# Patient Record
Sex: FEMALE | Race: WHITE | NOT HISPANIC OR LATINO | Employment: FULL TIME | URBAN - METROPOLITAN AREA
[De-identification: names, ages, dates, MRNs, and addresses within clinical notes are randomized per-mention and may not be internally consistent; named-entity substitution may affect disease eponyms.]

---

## 2022-03-03 PROBLEM — Z90.49 S/P COLON RESECTION: Status: ACTIVE | Noted: 2021-11-16

## 2022-03-03 PROBLEM — K52.9 COLITIS: Status: ACTIVE | Noted: 2021-11-03

## 2022-03-03 PROBLEM — E88.819 INSULIN RESISTANCE: Status: ACTIVE | Noted: 2018-08-15

## 2022-03-03 PROBLEM — N80.50: Status: ACTIVE | Noted: 2021-12-08

## 2023-09-27 ENCOUNTER — TELEPHONE (OUTPATIENT)
Dept: UROLOGY | Facility: CLINIC | Age: 33
End: 2023-09-27
Payer: COMMERCIAL

## 2023-10-04 ENCOUNTER — OFFICE VISIT (OUTPATIENT)
Dept: UROLOGY | Facility: CLINIC | Age: 33
End: 2023-10-04
Payer: COMMERCIAL

## 2023-10-04 VITALS
RESPIRATION RATE: 18 BRPM | SYSTOLIC BLOOD PRESSURE: 128 MMHG | WEIGHT: 141.75 LBS | TEMPERATURE: 98 F | BODY MASS INDEX: 24.2 KG/M2 | HEIGHT: 64 IN | DIASTOLIC BLOOD PRESSURE: 85 MMHG | HEART RATE: 88 BPM

## 2023-10-04 DIAGNOSIS — R10.9 ABDOMINAL PAIN, UNSPECIFIED ABDOMINAL LOCATION: ICD-10-CM

## 2023-10-04 DIAGNOSIS — R31.29 MICROHEMATURIA: ICD-10-CM

## 2023-10-04 DIAGNOSIS — R10.9 FLANK PAIN: ICD-10-CM

## 2023-10-04 DIAGNOSIS — N23 KIDNEY PAIN: Primary | ICD-10-CM

## 2023-10-04 LAB
BILIRUB UR QL STRIP: NEGATIVE
GLUCOSE UR QL STRIP: NEGATIVE
KETONES UR QL STRIP: NEGATIVE
LEUKOCYTE ESTERASE UR QL STRIP: NEGATIVE
PH, POC UA: 5.5
POC BLOOD, URINE: POSITIVE
POC NITRATES, URINE: NEGATIVE
POC RESIDUAL URINE VOLUME: 0 ML (ref 0–100)
PROT UR QL STRIP: NEGATIVE
SP GR UR STRIP: 1.03 (ref 1–1.03)
UROBILINOGEN UR STRIP-ACNC: 0.2 (ref 0.1–1.1)

## 2023-10-04 PROCEDURE — 87186 SC STD MICRODIL/AGAR DIL: CPT | Performed by: UROLOGY

## 2023-10-04 PROCEDURE — 99204 PR OFFICE/OUTPT VISIT, NEW, LEVL IV, 45-59 MIN: ICD-10-PCS | Mod: S$GLB,,, | Performed by: UROLOGY

## 2023-10-04 PROCEDURE — 1159F PR MEDICATION LIST DOCUMENTED IN MEDICAL RECORD: ICD-10-PCS | Mod: CPTII,S$GLB,, | Performed by: UROLOGY

## 2023-10-04 PROCEDURE — 3074F PR MOST RECENT SYSTOLIC BLOOD PRESSURE < 130 MM HG: ICD-10-PCS | Mod: CPTII,S$GLB,, | Performed by: UROLOGY

## 2023-10-04 PROCEDURE — 87088 URINE BACTERIA CULTURE: CPT | Performed by: UROLOGY

## 2023-10-04 PROCEDURE — 81001 URINALYSIS AUTO W/SCOPE: CPT | Performed by: UROLOGY

## 2023-10-04 PROCEDURE — 3079F DIAST BP 80-89 MM HG: CPT | Mod: CPTII,S$GLB,, | Performed by: UROLOGY

## 2023-10-04 PROCEDURE — 1159F MED LIST DOCD IN RCRD: CPT | Mod: CPTII,S$GLB,, | Performed by: UROLOGY

## 2023-10-04 PROCEDURE — 3008F PR BODY MASS INDEX (BMI) DOCUMENTED: ICD-10-PCS | Mod: CPTII,S$GLB,, | Performed by: UROLOGY

## 2023-10-04 PROCEDURE — 99204 OFFICE O/P NEW MOD 45 MIN: CPT | Mod: S$GLB,,, | Performed by: UROLOGY

## 2023-10-04 PROCEDURE — 81001 URINALYSIS AUTO W/SCOPE: CPT | Mod: S$GLB,,, | Performed by: UROLOGY

## 2023-10-04 PROCEDURE — 3079F PR MOST RECENT DIASTOLIC BLOOD PRESSURE 80-89 MM HG: ICD-10-PCS | Mod: CPTII,S$GLB,, | Performed by: UROLOGY

## 2023-10-04 PROCEDURE — 3008F BODY MASS INDEX DOCD: CPT | Mod: CPTII,S$GLB,, | Performed by: UROLOGY

## 2023-10-04 PROCEDURE — 51798 POCT BLADDER SCAN: ICD-10-PCS | Mod: S$GLB,,, | Performed by: UROLOGY

## 2023-10-04 PROCEDURE — 99999 PR PBB SHADOW E&M-EST. PATIENT-LVL IV: CPT | Mod: PBBFAC,,, | Performed by: UROLOGY

## 2023-10-04 PROCEDURE — 99999 PR PBB SHADOW E&M-EST. PATIENT-LVL IV: ICD-10-PCS | Mod: PBBFAC,,, | Performed by: UROLOGY

## 2023-10-04 PROCEDURE — 51798 US URINE CAPACITY MEASURE: CPT | Mod: S$GLB,,, | Performed by: UROLOGY

## 2023-10-04 PROCEDURE — 87086 URINE CULTURE/COLONY COUNT: CPT | Performed by: UROLOGY

## 2023-10-04 PROCEDURE — 87077 CULTURE AEROBIC IDENTIFY: CPT | Performed by: UROLOGY

## 2023-10-04 PROCEDURE — 3074F SYST BP LT 130 MM HG: CPT | Mod: CPTII,S$GLB,, | Performed by: UROLOGY

## 2023-10-04 PROCEDURE — 81001 POCT URINALYSIS, DIPSTICK, AUTOMATED, W/O SCOPE: ICD-10-PCS | Mod: S$GLB,,, | Performed by: UROLOGY

## 2023-10-04 NOTE — PROGRESS NOTES
Chief Complaint   Patient presents with    Other     Follow up. Pt reports of kidney pain on the right pain.         History of Present Illness:   Lu Pelletier is a 33 y.o. female here for evaluation of Other (Follow up. Pt reports of kidney pain on the right pain.)    10/4/23-32yo female, here for evaluation of possible kidney pain. She last saw me 5 years ago at [facility] for recurrent UTI. Currently 5 weeks post-op from surgery in [place]. Operative report and path report were reviewed, noting that she underwent exploratory laparoscopy with excision of numerous necrotizing granulomas as well as appendectomy. She also underwent cystoscopy with hydrodistention at that time which showed petechial hemorrhage in her bladder and there was concern for IC. She reports that she has pain first thing in the am when her bladder is full. She also has pain when she first urinates. She also reports right flank pain for about a week. She had pyelonephritis right before her surgery. Was on IV ertapenem.   She has been on numerous abx, but not currently on anything.         Review of Systems   Constitutional:  Negative for chills and fever.   Genitourinary:  Positive for flank pain and pelvic pain.   All other systems reviewed and are negative.      Past Medical History:   Diagnosis Date    Endometriosis     Endometriosis of large intestine     PCOS (polycystic ovarian syndrome)        Past Surgical History:   Procedure Laterality Date    DIAGNOSTIC LAPAROSCOPY      Lysis adhesion, Chromotubation, diagnostic hysteroscopy    Laparoscopic/minilap Left Colon and Rectum Resection with Anastamosis; Lysis of Adhesions; left oophorectomy  2021    Dr. Rupal VILLEGAS/BS/right oophorectomy Left 2019    OVARIAN CYST REMOVAL      XLap, age 14    SINUS SURGERY      TONSILLECTOMY         Family History   Problem Relation Age of Onset    Hypertension Mother     Hyperlipidemia Mother     Hypertension Father     Hyperlipidemia Father      Ovarian cancer Maternal Grandmother     Diabetes Maternal Grandmother     Diabetes Maternal Grandfather        Social History     Tobacco Use    Smoking status: Never     Passive exposure: Never    Smokeless tobacco: Never   Substance Use Topics    Alcohol use: Yes    Drug use: Never       Current Outpatient Medications   Medication Sig Dispense Refill    traMADoL (ULTRAM) 50 mg tablet Take 1 tablet (50 mg total) by mouth every 6 (six) hours as needed for Pain. (Patient not taking: Reported on 10/4/2023) 30 tablet 0     No current facility-administered medications for this visit.       Review of patient's allergies indicates:   Allergen Reactions    Adhesive tape-silicones      Other reaction(s): Skin Irritation    Biaxin [clarithromycin]        Physical Exam  Vitals:    10/04/23 1041   BP: 128/85   Pulse: 88   Resp: 18   Temp: 98.4 °F (36.9 °C)     General: Well-developed, well-nourished, in no acute distress  HEENT: Normocephalic, atraumatic, extraocular movements intact  Neck: Supple, no supraclavicular or cervical lymphadenopathy, trachea midline  Respirations: even and unlabored  Back: midline spine, No CVA tenderness  Abdomen: soft, Non-tender, non-distended, no palpable masses, no rebound or guarding  Extremities: moves all equally, no clubbing, cyanosis or edema  Skin: Warm and dry. No lesions  Psych: normal affect  Neuro: Alert and oriented x 3. Cranial nerves II-XII intact      Urinalysis  Positive for blood, otherwise negative      Assessment:  1. Kidney pain  POCT Urinalysis, Dipstick, Automated, W/O Scope    POCT Bladder Scan      2. Abdominal pain, unspecified abdominal location  CT Renal Stone Study ABD Pelvis WO      3. Flank pain        4. Microhematuria  Urine culture    Urinalysis Microscopic            Plan:   Kidney pain  -     POCT Urinalysis, Dipstick, Automated, W/O Scope  -     POCT Bladder Scan    Abdominal pain, unspecified abdominal location  -     CT Renal Stone Study ABD Pelvis WO;  Future; Expected date: 10/04/2023    Flank pain    Microhematuria  -     Urine culture  -     Urinalysis Microscopic

## 2023-10-05 LAB
BACTERIA #/AREA URNS AUTO: NORMAL /HPF
HYALINE CASTS UR QL AUTO: 0 /LPF
MICROSCOPIC COMMENT: NORMAL
RBC #/AREA URNS AUTO: 2 /HPF (ref 0–4)
SQUAMOUS #/AREA URNS AUTO: 1 /HPF
WBC #/AREA URNS AUTO: 1 /HPF (ref 0–5)

## 2023-10-06 ENCOUNTER — HOSPITAL ENCOUNTER (OUTPATIENT)
Dept: RADIOLOGY | Facility: HOSPITAL | Age: 33
Discharge: HOME | End: 2023-10-06
Attending: UROLOGY
Payer: COMMERCIAL

## 2023-10-06 DIAGNOSIS — R10.9 ABDOMINAL PAIN, UNSPECIFIED ABDOMINAL LOCATION: ICD-10-CM

## 2023-10-06 PROCEDURE — 74176 CT ABD & PELVIS W/O CONTRAST: CPT | Mod: 26,,, | Performed by: RADIOLOGY

## 2023-10-06 PROCEDURE — 74176 CT ABD & PELVIS W/O CONTRAST: CPT | Mod: TC,PN

## 2023-10-06 PROCEDURE — 74176 CT RENAL STONE STUDY ABD PELVIS WO: ICD-10-PCS | Mod: 26,,, | Performed by: RADIOLOGY

## 2023-10-07 ENCOUNTER — PATIENT MESSAGE (OUTPATIENT)
Dept: UROLOGY | Facility: CLINIC | Age: 33
End: 2023-10-07
Payer: COMMERCIAL

## 2023-10-07 LAB — BACTERIA UR CULT: ABNORMAL

## 2023-10-07 RX ORDER — SULFAMETHOXAZOLE AND TRIMETHOPRIM 800; 160 MG/1; MG/1
1 TABLET ORAL 2 TIMES DAILY
Qty: 14 TABLET | Refills: 0 | Status: SHIPPED | OUTPATIENT
Start: 2023-10-07 | End: 2023-10-14

## 2023-10-18 ENCOUNTER — TELEPHONE (OUTPATIENT)
Dept: UROLOGY | Facility: CLINIC | Age: 33
End: 2023-10-18
Payer: COMMERCIAL

## 2023-10-18 DIAGNOSIS — N39.0 RECURRENT UTI: Primary | ICD-10-CM

## 2023-10-19 ENCOUNTER — LAB VISIT (OUTPATIENT)
Dept: LAB | Facility: HOSPITAL | Age: 33
End: 2023-10-19
Attending: UROLOGY
Payer: COMMERCIAL

## 2023-10-19 DIAGNOSIS — N39.0 RECURRENT UTI: ICD-10-CM

## 2023-10-19 PROCEDURE — 87088 URINE BACTERIA CULTURE: CPT | Performed by: UROLOGY

## 2023-10-19 PROCEDURE — 87077 CULTURE AEROBIC IDENTIFY: CPT | Performed by: UROLOGY

## 2023-10-19 PROCEDURE — 87186 SC STD MICRODIL/AGAR DIL: CPT | Performed by: UROLOGY

## 2023-10-19 PROCEDURE — 87086 URINE CULTURE/COLONY COUNT: CPT | Performed by: UROLOGY

## 2023-10-22 LAB — BACTERIA UR CULT: ABNORMAL

## 2023-10-23 ENCOUNTER — TELEPHONE (OUTPATIENT)
Dept: UROLOGY | Facility: CLINIC | Age: 33
End: 2023-10-23
Payer: COMMERCIAL

## 2023-10-23 DIAGNOSIS — N23 KIDNEY PAIN: Primary | ICD-10-CM

## 2023-10-23 RX ORDER — AMOXICILLIN 500 MG/1
500 CAPSULE ORAL EVERY 8 HOURS
Qty: 21 CAPSULE | Refills: 0 | Status: SHIPPED | OUTPATIENT
Start: 2023-10-23 | End: 2023-10-30

## 2023-10-23 NOTE — TELEPHONE ENCOUNTER
Informed pt of results per Dr. Galvan    ----- Message from Niurka Galvan MD sent at 10/23/2023  7:20 AM CDT -----  Notify patient of UTI. Amoxicillin sent to the pharmacy.

## 2023-11-06 ENCOUNTER — LAB VISIT (OUTPATIENT)
Dept: LAB | Facility: HOSPITAL | Age: 33
End: 2023-11-06
Attending: UROLOGY
Payer: COMMERCIAL

## 2023-11-06 DIAGNOSIS — N23 KIDNEY PAIN: ICD-10-CM

## 2023-11-06 PROCEDURE — 87086 URINE CULTURE/COLONY COUNT: CPT | Performed by: UROLOGY

## 2023-11-07 LAB
BACTERIA UR CULT: NORMAL
BACTERIA UR CULT: NORMAL

## 2024-03-20 ENCOUNTER — TELEPHONE (OUTPATIENT)
Dept: UROLOGY | Facility: CLINIC | Age: 34
End: 2024-03-20
Payer: COMMERCIAL

## 2024-03-20 ENCOUNTER — PATIENT MESSAGE (OUTPATIENT)
Dept: UROLOGY | Facility: CLINIC | Age: 34
End: 2024-03-20
Payer: COMMERCIAL

## 2024-03-20 NOTE — TELEPHONE ENCOUNTER
03/20/2024 1355 - .Outgoing call placed to patient, patient verified name and date of birth, patient notified that Dr. Galvan is ok to overbook her to see her in office soon, patient notified of availability and agreed to next Tuesday at 12:45pm to be seen, patient verbalized understanding of appt and no further assistance needed.    Juan A Naranjo RN

## 2024-03-20 NOTE — TELEPHONE ENCOUNTER
03/20/2024 1328 - .Outgoing call placed to patient, patient verified name and date of birth, patient stated that she went to walk in clinic for this and wanted to see about getting in sooner with Dr. Galvan, notified her of availability and will keep current appt and placed on waitlist in case something opens up, patient also what to know if Dr. Galvan can suggest anything for her to take for kidney pain, message forwarded to Dr. Galvan. Patient verbalized understanding.    Juan A Naranjo RN

## 2024-03-26 ENCOUNTER — OFFICE VISIT (OUTPATIENT)
Dept: UROLOGY | Facility: CLINIC | Age: 34
End: 2024-03-26
Payer: COMMERCIAL

## 2024-03-26 VITALS — HEIGHT: 64 IN | RESPIRATION RATE: 18 BRPM | BODY MASS INDEX: 26.26 KG/M2

## 2024-03-26 DIAGNOSIS — N95.2 VAGINAL ATROPHY: ICD-10-CM

## 2024-03-26 DIAGNOSIS — N39.0 RECURRENT UTI: Primary | ICD-10-CM

## 2024-03-26 PROCEDURE — 3008F BODY MASS INDEX DOCD: CPT | Mod: CPTII,S$GLB,, | Performed by: UROLOGY

## 2024-03-26 PROCEDURE — 87086 URINE CULTURE/COLONY COUNT: CPT | Performed by: UROLOGY

## 2024-03-26 PROCEDURE — 99214 OFFICE O/P EST MOD 30 MIN: CPT | Mod: S$GLB,,, | Performed by: UROLOGY

## 2024-03-26 PROCEDURE — 99999 PR PBB SHADOW E&M-EST. PATIENT-LVL III: CPT | Mod: PBBFAC,,, | Performed by: UROLOGY

## 2024-03-26 RX ORDER — ESTRADIOL 0.1 MG/G
CREAM VAGINAL
COMMUNITY
Start: 2024-01-23 | End: 2024-03-26 | Stop reason: SDUPTHER

## 2024-03-26 RX ORDER — ACETYLCYSTEINE 600 MG
CAPSULE ORAL
COMMUNITY
Start: 2024-01-23

## 2024-03-26 RX ORDER — ESTRADIOL 0.1 MG/G
CREAM VAGINAL
Qty: 42.5 G | Refills: 6 | Status: SHIPPED | OUTPATIENT
Start: 2024-03-26

## 2024-03-26 RX ORDER — METHENAMINE HIPPURATE 1000 MG/1
1 TABLET ORAL 2 TIMES DAILY
Qty: 60 TABLET | Refills: 11 | Status: SHIPPED | OUTPATIENT
Start: 2024-03-26

## 2024-03-26 RX ORDER — PROGESTERONE 100 MG/1
CAPSULE ORAL
COMMUNITY
Start: 2024-01-23

## 2024-03-26 NOTE — PROGRESS NOTES
Chief Complaint   Patient presents with    Follow-up     Kidney infection         History of Present Illness:   Lu Pelletier is a 34 y.o. female here for evaluation of Follow-up (Kidney infection)    3/26/24-Pt was recently treated for pyelonephritis with 10 days of augmentin, completed 3 days ago. She reports that she was having flank pain and bladder spasms. No traditional dysuria with UTIs. No frequency. Using vaginal estrogen, but can't tolerate systemic estrogen. She does have a lot of vaginal dryness    10/4/23-34yo female, here for evaluation of possible kidney pain. She last saw me 5 years ago at [facility] for recurrent UTI. Currently 5 weeks post-op from surgery in [place]. Operative report and path report were reviewed, noting that she underwent exploratory laparoscopy with excision of numerous necrotizing granulomas as well as appendectomy. She also underwent cystoscopy with hydrodistention at that time which showed petechial hemorrhage in her bladder and there was concern for IC. She reports that she has pain first thing in the am when her bladder is full. She also has pain when she first urinates. She also reports right flank pain for about a week. She had pyelonephritis right before her surgery. Was on IV ertapenem.   She has been on numerous abx, but not currently on anything.         Review of Systems   Constitutional:  Negative for chills and fever.   Genitourinary:  Positive for flank pain and pelvic pain.   All other systems reviewed and are negative.      Past Medical History:   Diagnosis Date    Endometriosis     Endometriosis of large intestine     PCOS (polycystic ovarian syndrome)        Past Surgical History:   Procedure Laterality Date    DIAGNOSTIC LAPAROSCOPY      Lysis adhesion, Chromotubation, diagnostic hysteroscopy    Laparoscopic/minilap Left Colon and Rectum Resection with Anastamosis; Lysis of Adhesions; left oophorectomy  2021    Dr. Rupal VILLEGAS/SERA/right oophorectomy  Left 2019    OVARIAN CYST REMOVAL      XLap, age 14    SINUS SURGERY      TONSILLECTOMY         Family History   Problem Relation Age of Onset    Hypertension Mother     Hyperlipidemia Mother     Hypertension Father     Hyperlipidemia Father     Ovarian cancer Maternal Grandmother     Diabetes Maternal Grandmother     Diabetes Maternal Grandfather        Social History     Tobacco Use    Smoking status: Never     Passive exposure: Never    Smokeless tobacco: Never   Substance Use Topics    Alcohol use: Yes    Drug use: Never       Current Outpatient Medications   Medication Sig Dispense Refill    acetylcysteine 600 mg Cap NAC (60 capsules) ([employer]): Please take  1 capsule, three times / day .      prasterone, dhea,-calcium carb 10 mg-47 mg calcium Tab DHEA 10mg (60 capsules) (Pure Encapsulations): Please take  1 capsule, once / day (with a meal.)      progesterone (PROMETRIUM) 100 MG capsule Take 1 capsule every day by oral route for 12 days.      estradioL (ESTRACE) 0.01 % (0.1 mg/gram) vaginal cream Insert 0.5 g every day by vaginal route for 30 days. 42.5 g 6    methenamine (HIPREX) 1 gram Tab Take 1 tablet (1 g total) by mouth 2 (two) times daily. 60 tablet 11    turmeric (CURCUMIN MISC) by Misc.(Non-Drug; Combo Route) route.       No current facility-administered medications for this visit.       Review of patient's allergies indicates:   Allergen Reactions    Adhesive tape-silicones      Other reaction(s): Skin Irritation    Biaxin [clarithromycin]        Physical Exam  Vitals:    03/26/24 1254   Resp: 18     General: Well-developed, well-nourished, in no acute distress  HEENT: Normocephalic, atraumatic, extraocular movements intact  Neck: Supple, no supraclavicular or cervical lymphadenopathy, trachea midline  Respirations: even and unlabored  Back: midline spine, No CVA tenderness  Abdomen: soft, Non-tender, non-distended, no palpable masses, no rebound or guarding  Extremities: moves all  equally, no clubbing, cyanosis or edema  Skin: Warm and dry. No lesions  Psych: normal affect  Neuro: Alert and oriented x 3. Cranial nerves II-XII intact    PVR: 58cc    Urinalysis  Negative for blood, LE, nit      Assessment:  1. Recurrent UTI  Urine culture      2. Vaginal atrophy                Plan:   Recurrent UTI  -     Urine culture    Vaginal atrophy    Other orders  -     methenamine (HIPREX) 1 gram Tab; Take 1 tablet (1 g total) by mouth 2 (two) times daily.  Dispense: 60 tablet; Refill: 11  -     estradioL (ESTRACE) 0.01 % (0.1 mg/gram) vaginal cream; Insert 0.5 g every day by vaginal route for 30 days.  Dispense: 42.5 g; Refill: 6    Follow up in about 6 months (around 9/26/2024).

## 2024-03-28 LAB — BACTERIA UR CULT: NORMAL

## 2024-10-01 ENCOUNTER — OFFICE VISIT (OUTPATIENT)
Dept: UROLOGY | Facility: CLINIC | Age: 34
End: 2024-10-01
Payer: COMMERCIAL

## 2024-10-01 VITALS
RESPIRATION RATE: 18 BRPM | HEIGHT: 64 IN | SYSTOLIC BLOOD PRESSURE: 113 MMHG | BODY MASS INDEX: 24.99 KG/M2 | DIASTOLIC BLOOD PRESSURE: 77 MMHG | WEIGHT: 146.38 LBS | HEART RATE: 78 BPM | TEMPERATURE: 98 F

## 2024-10-01 DIAGNOSIS — N39.3 SUI (STRESS URINARY INCONTINENCE, FEMALE): Primary | ICD-10-CM

## 2024-10-01 DIAGNOSIS — R31.29 MICROHEMATURIA: ICD-10-CM

## 2024-10-01 DIAGNOSIS — N39.0 RECURRENT UTI: ICD-10-CM

## 2024-10-01 LAB
BILIRUB UR QL STRIP: NEGATIVE
GLUCOSE UR QL STRIP: NEGATIVE
KETONES UR QL STRIP: NEGATIVE
LEUKOCYTE ESTERASE UR QL STRIP: NEGATIVE
PH, POC UA: 5.5
POC BLOOD, URINE: POSITIVE
POC NITRATES, URINE: NEGATIVE
PROT UR QL STRIP: NEGATIVE
SP GR UR STRIP: 1.03 (ref 1–1.03)
UROBILINOGEN UR STRIP-ACNC: 0.2 (ref 0.1–1.1)

## 2024-10-01 PROCEDURE — 81003 URINALYSIS AUTO W/O SCOPE: CPT | Mod: QW,S$GLB,, | Performed by: UROLOGY

## 2024-10-01 PROCEDURE — 1160F RVW MEDS BY RX/DR IN RCRD: CPT | Mod: CPTII,S$GLB,, | Performed by: UROLOGY

## 2024-10-01 PROCEDURE — 1159F MED LIST DOCD IN RCRD: CPT | Mod: CPTII,S$GLB,, | Performed by: UROLOGY

## 2024-10-01 PROCEDURE — 3008F BODY MASS INDEX DOCD: CPT | Mod: CPTII,S$GLB,, | Performed by: UROLOGY

## 2024-10-01 PROCEDURE — 99999 PR PBB SHADOW E&M-EST. PATIENT-LVL IV: CPT | Mod: PBBFAC,,, | Performed by: UROLOGY

## 2024-10-01 PROCEDURE — 3074F SYST BP LT 130 MM HG: CPT | Mod: CPTII,S$GLB,, | Performed by: UROLOGY

## 2024-10-01 PROCEDURE — 3078F DIAST BP <80 MM HG: CPT | Mod: CPTII,S$GLB,, | Performed by: UROLOGY

## 2024-10-01 PROCEDURE — 81001 URINALYSIS AUTO W/SCOPE: CPT | Performed by: UROLOGY

## 2024-10-01 PROCEDURE — 99214 OFFICE O/P EST MOD 30 MIN: CPT | Mod: S$GLB,,, | Performed by: UROLOGY

## 2024-10-01 NOTE — PROGRESS NOTES
Chief Complaint   Patient presents with    Follow-up     6 month f/u         History of Present Illness:   Lu Pelletier is a 34 y.o. female here for evaluation of Follow-up (6 month f/u)    10/1/24-Pain had completely resolved and then she had a UTI and her pain flared back up, which persisted even after completion of abx. Pain lasted 3-4 weeks. Called her surgeon in [place], and it was just felt to be inflammed due to UTI. Recommended pelvic floor PT, but then pain resolved. If she exerts herself, she has lower abdominal and lower back pain. Not too bad and she is doing PT. She is having incontinence and wearing a pad. She has AMINAH with cough/sneeze and also some post-void dribble. Pt has been on methenamine and using vaginal estrogen.     3/26/24-Pt was recently treated for pyelonephritis with 10 days of augmentin, completed 3 days ago. She reports that she was having flank pain and bladder spasms. No traditional dysuria with UTIs. No frequency. Using vaginal estrogen, but can't tolerate systemic estrogen. She does have a lot of vaginal dryness    10/4/23-32yo female, here for evaluation of possible kidney pain. She last saw me 5 years ago at [facility] for recurrent UTI. Currently 5 weeks post-op from surgery in [place]. Operative report and path report were reviewed, noting that she underwent exploratory laparoscopy with excision of numerous necrotizing granulomas as well as appendectomy. She also underwent cystoscopy with hydrodistention at that time which showed petechial hemorrhage in her bladder and there was concern for IC. She reports that she has pain first thing in the am when her bladder is full. She also has pain when she first urinates. She also reports right flank pain for about a week. She had pyelonephritis right before her surgery. Was on IV ertapenem.   She has been on numerous abx, but not currently on anything.         Review of Systems   Constitutional:  Negative for chills and fever.    Genitourinary:  Positive for flank pain and pelvic pain.   All other systems reviewed and are negative.      Past Medical History:   Diagnosis Date    Endometriosis     Endometriosis of large intestine     PCOS (polycystic ovarian syndrome)        Past Surgical History:   Procedure Laterality Date    DIAGNOSTIC LAPAROSCOPY      Lysis adhesion, Chromotubation, diagnostic hysteroscopy    Laparoscopic/minilap Left Colon and Rectum Resection with Anastamosis; Lysis of Adhesions; left oophorectomy  2021    Dr. Rupal VILLEGAS/BS/right oophorectomy Left 2019    OVARIAN CYST REMOVAL      XLap, age 14    SINUS SURGERY      TONSILLECTOMY         Family History   Problem Relation Name Age of Onset    Hypertension Mother      Hyperlipidemia Mother      Hypertension Father      Hyperlipidemia Father      Ovarian cancer Maternal Grandmother      Diabetes Maternal Grandmother      Diabetes Maternal Grandfather         Social History     Tobacco Use    Smoking status: Never     Passive exposure: Never    Smokeless tobacco: Never   Substance Use Topics    Alcohol use: Yes    Drug use: Never       Current Outpatient Medications   Medication Sig Dispense Refill    acetylcysteine 600 mg Cap NAC (60 capsules) ([employer]): Please take  1 capsule, three times / day .      estradioL (ESTRACE) 0.01 % (0.1 mg/gram) vaginal cream Insert 0.5 g every day by vaginal route for 30 days. 42.5 g 6    methenamine (HIPREX) 1 gram Tab Take 1 tablet (1 g total) by mouth 2 (two) times daily. 60 tablet 11    prasterone, dhea,-calcium carb 10 mg-47 mg calcium Tab DHEA 10mg (60 capsules) (Pure Encapsulations): Please take  1 capsule, once / day (with a meal.)      progesterone (PROMETRIUM) 100 MG capsule Take 1 capsule every day by oral route for 12 days.      turmeric (CURCUMIN MISC) by Misc.(Non-Drug; Combo Route) route.       No current facility-administered medications for this visit.       Review of patient's allergies  indicates:   Allergen Reactions    Adhesive tape-silicones      Other reaction(s): Skin Irritation    Biaxin [clarithromycin]        Physical Exam  Vitals:    10/01/24 1100   BP: 113/77   Pulse: 78   Resp: 18   Temp: 98.2 °F (36.8 °C)     General: Well-developed, well-nourished, in no acute distress  HEENT: Normocephalic, atraumatic, extraocular movements intact  Neck: Supple, no supraclavicular or cervical lymphadenopathy, trachea midline  Respirations: even and unlabored  Back: midline spine, No CVA tenderness  Extremities: moves all equally, no clubbing, cyanosis or edema  Skin: Warm and dry. No lesions  Psych: normal affect  Neuro: Alert and oriented x 3. Cranial nerves II-XII intact    PVR: 9cc    Urinalysis  Trace blood, otherwise negative      Assessment:  1. AMINAH (stress urinary incontinence, female)        2. Recurrent UTI  POCT Urinalysis, Dipstick, Automated, W/O Scope      3. Microhematuria  Urinalysis Microscopic                Plan:   AMINAH (stress urinary incontinence, female)    Recurrent UTI  -     POCT Urinalysis, Dipstick, Automated, W/O Scope    Microhematuria  -     Urinalysis Microscopic    Discussed bulkamid and pt may be interested. She will message if she wants to schedule. Educational material provided.   Continue vaginal estrogen and hiprex  Follow up in about 6 months (around 4/1/2025).

## 2024-10-02 LAB
AMORPH CRY UR QL COMP ASSIST: NORMAL
MICROSCOPIC COMMENT: NORMAL

## 2024-11-18 ENCOUNTER — PATIENT MESSAGE (OUTPATIENT)
Dept: UROLOGY | Facility: CLINIC | Age: 34
End: 2024-11-18
Payer: COMMERCIAL

## 2024-11-18 DIAGNOSIS — Z01.818 PRE-OP TESTING: ICD-10-CM

## 2024-11-18 DIAGNOSIS — N39.3 SUI (STRESS URINARY INCONTINENCE, FEMALE): Primary | ICD-10-CM

## 2024-11-18 RX ORDER — SODIUM CHLORIDE 9 MG/ML
INJECTION, SOLUTION INTRAVENOUS CONTINUOUS
OUTPATIENT
Start: 2024-11-18

## 2024-11-18 RX ORDER — CEFAZOLIN SODIUM 2 G/50ML
2 SOLUTION INTRAVENOUS
OUTPATIENT
Start: 2024-11-18

## 2024-12-09 ENCOUNTER — PATIENT MESSAGE (OUTPATIENT)
Dept: UROLOGY | Facility: CLINIC | Age: 34
End: 2024-12-09
Payer: COMMERCIAL

## 2024-12-11 ENCOUNTER — PATIENT MESSAGE (OUTPATIENT)
Dept: UROLOGY | Facility: CLINIC | Age: 34
End: 2024-12-11
Payer: COMMERCIAL

## 2024-12-13 ENCOUNTER — HOSPITAL ENCOUNTER (OUTPATIENT)
Dept: RADIOLOGY | Facility: HOSPITAL | Age: 34
Discharge: HOME | End: 2024-12-13
Attending: UROLOGY
Payer: COMMERCIAL

## 2024-12-13 DIAGNOSIS — Z01.818 PRE-OP TESTING: ICD-10-CM

## 2024-12-13 PROCEDURE — 71046 X-RAY EXAM CHEST 2 VIEWS: CPT | Mod: 26,,, | Performed by: RADIOLOGY

## 2024-12-13 PROCEDURE — 71046 X-RAY EXAM CHEST 2 VIEWS: CPT | Mod: TC,FY,PO

## 2024-12-17 ENCOUNTER — PATIENT MESSAGE (OUTPATIENT)
Dept: PREADMISSION TESTING | Facility: HOSPITAL | Age: 34
End: 2024-12-17
Payer: COMMERCIAL

## 2024-12-20 ENCOUNTER — PATIENT MESSAGE (OUTPATIENT)
Dept: UROLOGY | Facility: CLINIC | Age: 34
End: 2024-12-20
Payer: COMMERCIAL

## 2024-12-20 ENCOUNTER — PATIENT MESSAGE (OUTPATIENT)
Dept: PREADMISSION TESTING | Facility: HOSPITAL | Age: 34
End: 2024-12-20
Payer: COMMERCIAL

## 2024-12-24 ENCOUNTER — PATIENT MESSAGE (OUTPATIENT)
Dept: UROLOGY | Facility: CLINIC | Age: 34
End: 2024-12-24
Payer: COMMERCIAL

## 2024-12-24 DIAGNOSIS — N39.0 RECURRENT UTI: Primary | ICD-10-CM

## 2024-12-26 RX ORDER — CEFDINIR 300 MG/1
300 CAPSULE ORAL 2 TIMES DAILY
Qty: 14 CAPSULE | Refills: 0 | Status: SHIPPED | OUTPATIENT
Start: 2024-12-26 | End: 2025-01-02

## 2024-12-27 ENCOUNTER — LAB VISIT (OUTPATIENT)
Dept: LAB | Facility: HOSPITAL | Age: 34
End: 2024-12-27
Attending: UROLOGY
Payer: COMMERCIAL

## 2024-12-27 DIAGNOSIS — N39.0 RECURRENT UTI: Primary | ICD-10-CM

## 2024-12-27 DIAGNOSIS — N39.0 RECURRENT UTI: ICD-10-CM

## 2024-12-27 LAB
BILIRUB UR QL STRIP: NEGATIVE
CLARITY UR REFRACT.AUTO: CLEAR
COLOR UR AUTO: YELLOW
GLUCOSE UR QL STRIP: NEGATIVE
HGB UR QL STRIP: NEGATIVE
KETONES UR QL STRIP: NEGATIVE
LEUKOCYTE ESTERASE UR QL STRIP: NEGATIVE
NITRITE UR QL STRIP: NEGATIVE
PH UR STRIP: 6 [PH] (ref 5–8)
PROT UR QL STRIP: NEGATIVE
SP GR UR STRIP: 1.02 (ref 1–1.03)
URN SPEC COLLECT METH UR: NORMAL

## 2024-12-27 PROCEDURE — 87086 URINE CULTURE/COLONY COUNT: CPT | Performed by: UROLOGY

## 2024-12-27 PROCEDURE — 81003 URINALYSIS AUTO W/O SCOPE: CPT | Performed by: UROLOGY

## 2024-12-27 RX ORDER — PHENAZOPYRIDINE HYDROCHLORIDE 200 MG/1
200 TABLET, FILM COATED ORAL 3 TIMES DAILY PRN
Qty: 15 TABLET | Refills: 0 | Status: SHIPPED | OUTPATIENT
Start: 2024-12-27 | End: 2025-01-06

## 2024-12-28 LAB
BACTERIA UR CULT: NORMAL
BACTERIA UR CULT: NORMAL

## 2024-12-30 ENCOUNTER — TELEPHONE (OUTPATIENT)
Dept: UROLOGY | Facility: CLINIC | Age: 34
End: 2024-12-30
Payer: COMMERCIAL

## 2024-12-30 NOTE — TELEPHONE ENCOUNTER
.Outgoing call placed to patient, patient verified name and date of birth, patient notified of below, verbalized understanding and stated she will wait for now to reschedule the bulkamid.       ----- Message from Niurka Galvan MD sent at 12/30/2024 10:29 AM CST -----  Notify pt that no specific bacteria grew on the culture, but it does look like it may have been contaminated with skin bacteria. If the antibiotics are helping, she may complete them. If not, she may stop them. Please also see if she wants to reschedule bulkamid. I have 1/2 and 1/13 available if she would like.